# Patient Record
Sex: FEMALE | URBAN - METROPOLITAN AREA
[De-identification: names, ages, dates, MRNs, and addresses within clinical notes are randomized per-mention and may not be internally consistent; named-entity substitution may affect disease eponyms.]

---
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
## 2025-01-14 ENCOUNTER — APPOINTMENT (OUTPATIENT)
Age: 20
Setting detail: DERMATOLOGY
End: 2025-01-14

## 2025-01-14 DIAGNOSIS — Z41.9 ENCOUNTER FOR PROCEDURE FOR PURPOSES OTHER THAN REMEDYING HEALTH STATE, UNSPECIFIED: ICD-10-CM

## 2025-01-14 PROCEDURE — ? ALMA LASER

## 2025-01-14 ASSESSMENT — LOCATION ZONE DERM: LOCATION ZONE: TRUNK

## 2025-01-14 ASSESSMENT — LOCATION SIMPLE DESCRIPTION DERM: LOCATION SIMPLE: GROIN

## 2025-01-14 ASSESSMENT — LOCATION DETAILED DESCRIPTION DERM: LOCATION DETAILED: RIGHT SUPRAPUBIC SKIN

## 2025-01-14 NOTE — PROCEDURE: ALMA LASER
Shr Post-Care: Post care reviewed with patient.
Power In [place]: 50
Energy (Mj/P): 600
Area In Cm^2: 1
Pulse Width: 30 ms
Pulse Mode (Optional): L
Nd:Yag Post-Care: Immediate endpoint whitening and pinpoint bleeding. Vaseline and ice applied. Post care reviewed with patient.
Energy (Optional): 1200 mJ/P
Detail Level: Detailed
Post-Care To Use?: Generic
Fluence (Mj/Cm2): 4
Fluence (J/Cm2): 13
Overlap (Optional): 50%
Handpiece: Uniform
Ipl Post-Care: Vaseline and ice applied. Post care reviewed with patient.
Treatment Fluence In Mj: 10
Consent: Written consent obtained, risks reviewed including but not limited to crusting, scabbing, blistering, scarring, darker or lighter pigmentary change, systemic reactions, ulceration, incidental hair removal, bruising, and/or incomplete removal.
Spot Size In Mm: 3
Frequency (Optional): 2 Hz
External Cooling: SmartCool
Post-Care Instructions: I reviewed with the patient in detail post-care instructions. Patient should avoid sunlight and wear sun protection.
Clear Lift Post-Care: Post care reviewed with patient. Patient should avoid direct sunlight and wear broad spectrum sunscreen daily.
Frequency In Hz: Unibody
Total Energy (Kj): 6
Energy In Mj: 400
Plasma Intensity (%): 25
External Cooling Fan Speed: 5
Passes (Optional): 2

## 2025-02-11 ENCOUNTER — APPOINTMENT (OUTPATIENT)
Age: 20
Setting detail: DERMATOLOGY
End: 2025-02-11

## 2025-02-11 DIAGNOSIS — Z41.9 ENCOUNTER FOR PROCEDURE FOR PURPOSES OTHER THAN REMEDYING HEALTH STATE, UNSPECIFIED: ICD-10-CM

## 2025-02-11 PROCEDURE — ? ALMA LASER

## 2025-02-11 ASSESSMENT — LOCATION ZONE DERM: LOCATION ZONE: TRUNK

## 2025-02-11 ASSESSMENT — LOCATION SIMPLE DESCRIPTION DERM: LOCATION SIMPLE: GROIN

## 2025-02-11 ASSESSMENT — LOCATION DETAILED DESCRIPTION DERM: LOCATION DETAILED: LEFT SUPRAPUBIC SKIN

## 2025-02-11 NOTE — PROCEDURE: ALMA LASER
Post-Care Instructions: I reviewed with the patient in detail post-care instructions. Patient should avoid sunlight and wear sun protection.
Pulse Width: 50
Frequency In Hz: Unibody
Total Energy (Kj): 6
Clear Lift Post-Care: Post care reviewed with patient. Patient should avoid direct sunlight and wear broad spectrum sunscreen daily.
Repetition Rate (Hz): 1
Fluence (J/Cm2): 13
Fluence (J/Cm2): 15
Frequency (Optional): 2 Hz
External Cooling Fan Speed: 5
Energy In Mj: 400
Plasma Intensity (%): 25
Pulse Width: 30 ms
Pixel Post-Care: Post care reviewed with patient.
Passes (Optional): 2
Nd:Yag Post-Care: Immediate endpoint whitening and pinpoint bleeding. Vaseline and ice applied. Post care reviewed with patient.
Fluence (J/Cm2): 10
Energy (Optional): 1200 mJ/P
Fluence (Mj/Cm2): 4
Energy (Mj/P): 600
Pulse Mode (Optional): L
Ipl Post-Care: Vaseline and ice applied. Post care reviewed with patient.
Overlap (Optional): 50%
Handpiece: Uniform
Detail Level: Detailed
Post-Care To Use?: Generic
Consent: Written consent obtained, risks reviewed including but not limited to crusting, scabbing, blistering, scarring, darker or lighter pigmentary change, systemic reactions, ulceration, incidental hair removal, bruising, and/or incomplete removal.
External Cooling: SmartCool
Spot Size In Mm: 3

## 2025-03-11 ENCOUNTER — APPOINTMENT (OUTPATIENT)
Age: 20
Setting detail: DERMATOLOGY
End: 2025-03-11

## 2025-03-11 DIAGNOSIS — Z41.9 ENCOUNTER FOR PROCEDURE FOR PURPOSES OTHER THAN REMEDYING HEALTH STATE, UNSPECIFIED: ICD-10-CM

## 2025-03-11 PROCEDURE — ? ALMA LASER

## 2025-03-11 ASSESSMENT — LOCATION SIMPLE DESCRIPTION DERM: LOCATION SIMPLE: GROIN

## 2025-03-11 ASSESSMENT — LOCATION DETAILED DESCRIPTION DERM: LOCATION DETAILED: MONS PUBIS

## 2025-03-11 ASSESSMENT — LOCATION ZONE DERM: LOCATION ZONE: VULVA

## 2025-03-11 NOTE — PROCEDURE: ALMA LASER
Consent: Written consent obtained, risks reviewed including but not limited to crusting, scabbing, blistering, scarring, darker or lighter pigmentary change, systemic reactions, ulceration, incidental hair removal, bruising, and/or incomplete removal.
Treatment Number: 1
Fluence (J/Cm2): 15
Handpiece: Uniform
Treatment Fluence In Mj: 10
Ipl Post-Care: Vaseline and ice applied. Post care reviewed with patient.
Pulse Width: 50
External Cooling: SmartCool
Frequency (Optional): 2 Hz
Er:Yag Post-Care Generic: Post care reviewed with patient.
Spot Size In Mm: 3
Clear Lift Post-Care: Post care reviewed with patient. Patient should avoid direct sunlight and wear broad spectrum sunscreen daily.
Post-Care Instructions: I reviewed with the patient in detail post-care instructions. Patient should avoid sunlight and wear sun protection.
Frequency In Hz: Unibody
Plasma Intensity (%): 25
Energy (Mj/P): 600
Total Energy (Kj): 6
Energy In Mj: 400
Pulse Mode (Optional): L
Pulse Width: 30 ms
External Cooling Fan Speed: 5
Passes (Optional): 2
Post-Care To Use?: Generic
Nd:Yag Post-Care: Immediate endpoint whitening and pinpoint bleeding. Vaseline and ice applied. Post care reviewed with patient.
Fluence (J/Cm2): 13
Fluence (Mj/Cm2): 4
Energy (Optional): 1200 mJ/P
Detail Level: Detailed
Overlap (Optional): 50%

## 2025-04-08 ENCOUNTER — APPOINTMENT (OUTPATIENT)
Age: 20
Setting detail: DERMATOLOGY
End: 2025-04-08

## 2025-04-08 DIAGNOSIS — Z41.9 ENCOUNTER FOR PROCEDURE FOR PURPOSES OTHER THAN REMEDYING HEALTH STATE, UNSPECIFIED: ICD-10-CM

## 2025-04-08 PROCEDURE — ? ALMA LASER

## 2025-04-08 ASSESSMENT — LOCATION SIMPLE DESCRIPTION DERM: LOCATION SIMPLE: GENITALIA

## 2025-04-08 ASSESSMENT — LOCATION ZONE DERM: LOCATION ZONE: TRUNK

## 2025-04-08 ASSESSMENT — LOCATION DETAILED DESCRIPTION DERM: LOCATION DETAILED: GENITALIA

## 2025-05-06 ENCOUNTER — APPOINTMENT (OUTPATIENT)
Age: 20
Setting detail: DERMATOLOGY
End: 2025-05-06

## 2025-05-06 DIAGNOSIS — Z41.9 ENCOUNTER FOR PROCEDURE FOR PURPOSES OTHER THAN REMEDYING HEALTH STATE, UNSPECIFIED: ICD-10-CM

## 2025-05-06 PROCEDURE — ? ALMA LASER

## 2025-05-06 ASSESSMENT — LOCATION DETAILED DESCRIPTION DERM: LOCATION DETAILED: GENITALIA

## 2025-05-06 ASSESSMENT — LOCATION ZONE DERM: LOCATION ZONE: TRUNK

## 2025-05-06 ASSESSMENT — LOCATION SIMPLE DESCRIPTION DERM: LOCATION SIMPLE: GENITALIA

## 2025-05-06 NOTE — PROCEDURE: ALMA LASER
Pixel Post-Care: Post care reviewed with patient.
Partha Post-Care: Immediate endpoint whitening and pinpoint bleeding. Vaseline and ice applied. Post care reviewed with patient.
Treatment Number: 1
Energy (Mj/P): 600
Handpiece: Uniform
Detail Level: Detailed
Overlap (Optional): 50%
Pulse Mode (Optional): L
Treatment Fluence In Mj: 10
Ipl Post-Care: Vaseline and ice applied. Post care reviewed with patient.
Post-Care To Use?: Generic
Frequency (Optional): 2 Hz
Spot Size In Mm: 3
External Cooling: SmartCool
Clear Lift Post-Care: Post care reviewed with patient. Patient should avoid direct sunlight and wear broad spectrum sunscreen daily.
Total Energy (Kj): 6
Fluence (Mj/Cm2): 4
Frequency In Hz: Unibody
Passes (Optional): 2
Fluence (J/Cm2): 5
Pulse Width: 30 ms
Pulse Width: 50
Consent: Written consent obtained, risks reviewed including but not limited to crusting, scabbing, blistering, scarring, darker or lighter pigmentary change, systemic reactions, ulceration, incidental hair removal, bruising, and/or incomplete removal.
Energy In Mj: 400
Energy (Optional): 1200 mJ/P
Post-Care Instructions: I reviewed with the patient in detail post-care instructions. Patient should avoid sunlight and wear sun protection.
Plasma Intensity (%): 25

## 2025-06-10 ENCOUNTER — APPOINTMENT (OUTPATIENT)
Age: 20
Setting detail: DERMATOLOGY
End: 2025-06-10

## 2025-06-10 DIAGNOSIS — Z41.9 ENCOUNTER FOR PROCEDURE FOR PURPOSES OTHER THAN REMEDYING HEALTH STATE, UNSPECIFIED: ICD-10-CM

## 2025-06-10 PROCEDURE — ? ALMA LASER

## 2025-06-10 ASSESSMENT — LOCATION ZONE DERM: LOCATION ZONE: TRUNK

## 2025-06-10 ASSESSMENT — LOCATION SIMPLE DESCRIPTION DERM: LOCATION SIMPLE: GROIN

## 2025-06-10 ASSESSMENT — LOCATION DETAILED DESCRIPTION DERM: LOCATION DETAILED: LEFT SUPRAPUBIC SKIN

## 2025-06-10 NOTE — PROCEDURE: ALMA LASER
Energy In Mj: 400
Handpiece: Uniform
Repetition Rate (Hz): 1
Er:Yag Post-Care Nail Fungus: Post care reviewed with patient.
Consent: Written consent obtained, risks reviewed including but not limited to crusting, scabbing, blistering, scarring, darker or lighter pigmentary change, systemic reactions, ulceration, incidental hair removal, bruising, and/or incomplete removal.
Overlap (Optional): 50%
Fluence (J/Cm2): 5
Post-Care Instructions: I reviewed with the patient in detail post-care instructions. Patient should avoid sunlight and wear sun protection.
Treatment Fluence In Mj: 10
Plasma Intensity (%): 25
Energy (Mj/P): 600
Power In [place]: 50
Nd:Yag Post-Care: Immediate endpoint whitening and pinpoint bleeding. Vaseline and ice applied. Post care reviewed with patient.
Pulse Mode (Optional): L
Frequency (Optional): 2 Hz
Total Energy (Kj): 6
Post-Care To Use?: Generic
Clear Lift Post-Care: Post care reviewed with patient. Patient should avoid direct sunlight and wear broad spectrum sunscreen daily.
Passes (Optional): 2
Detail Level: Detailed
Ipl Post-Care: Vaseline and ice applied. Post care reviewed with patient.
Pulse Width: 30 ms
Fluence (Mj/Cm2): 4
Energy (Optional): 1200 mJ/P
Spot Size In Mm: 3
External Cooling: SmartCool
Frequency In Hz: Unibody